# Patient Record
Sex: FEMALE | Race: WHITE | Employment: OTHER | URBAN - NONMETROPOLITAN AREA
[De-identification: names, ages, dates, MRNs, and addresses within clinical notes are randomized per-mention and may not be internally consistent; named-entity substitution may affect disease eponyms.]

---

## 2017-01-03 ENCOUNTER — TELEPHONE (OUTPATIENT)
Dept: OBGYN | Age: 37
End: 2017-01-03

## 2017-01-25 ENCOUNTER — TELEPHONE (OUTPATIENT)
Dept: OBGYN | Age: 37
End: 2017-01-25

## 2017-03-28 ENCOUNTER — TELEPHONE (OUTPATIENT)
Dept: OBGYN | Age: 37
End: 2017-03-28

## 2017-03-28 RX ORDER — ALENDRONATE SODIUM 70 MG/1
TABLET ORAL
Qty: 4 TABLET | Refills: 1 | Status: SHIPPED | OUTPATIENT
Start: 2017-03-28 | End: 2017-04-04

## 2017-03-29 RX ORDER — IBANDRONATE SODIUM 150 MG/1
150 TABLET, FILM COATED ORAL
Qty: 12 TABLET | Refills: 0 | Status: SHIPPED | OUTPATIENT
Start: 2017-03-29 | End: 2018-10-24

## 2017-03-29 RX ORDER — IBANDRONATE SODIUM 150 MG/1
150 TABLET, FILM COATED ORAL
Qty: 12 TABLET | Refills: 0 | Status: SHIPPED | OUTPATIENT
Start: 2017-03-29 | End: 2017-03-29 | Stop reason: SDUPTHER

## 2017-04-03 RX ORDER — ESTRADIOL 0.5 MG/1
TABLET ORAL
Qty: 30 TABLET | Refills: 5 | Status: CANCELLED | OUTPATIENT
Start: 2017-04-03

## 2017-04-04 ENCOUNTER — TELEPHONE (OUTPATIENT)
Dept: OBGYN CLINIC | Age: 37
End: 2017-04-04

## 2017-04-21 ENCOUNTER — TELEPHONE (OUTPATIENT)
Dept: OBGYN | Age: 37
End: 2017-04-21

## 2017-04-24 RX ORDER — ESTERIFIED ESTROGEN AND METHYLTESTOSTERONE .625; 1.25 MG/1; MG/1
1 TABLET ORAL DAILY
Qty: 21 TABLET | Refills: 3 | Status: SHIPPED | OUTPATIENT
Start: 2017-04-24 | End: 2018-06-15 | Stop reason: SDUPTHER

## 2017-10-23 ENCOUNTER — OFFICE VISIT (OUTPATIENT)
Dept: OBGYN | Age: 37
End: 2017-10-23
Payer: COMMERCIAL

## 2017-10-23 VITALS
HEART RATE: 58 BPM | BODY MASS INDEX: 20.99 KG/M2 | WEIGHT: 126 LBS | SYSTOLIC BLOOD PRESSURE: 110 MMHG | DIASTOLIC BLOOD PRESSURE: 73 MMHG | HEIGHT: 65 IN

## 2017-10-23 DIAGNOSIS — Z12.4 CERVICAL CANCER SCREENING: ICD-10-CM

## 2017-10-23 DIAGNOSIS — Z01.419 WOMEN'S ANNUAL ROUTINE GYNECOLOGICAL EXAMINATION: Primary | ICD-10-CM

## 2017-10-23 PROCEDURE — 99395 PREV VISIT EST AGE 18-39: CPT | Performed by: OBSTETRICS & GYNECOLOGY

## 2017-10-23 ASSESSMENT — ENCOUNTER SYMPTOMS
BLOOD IN STOOL: 0
CONSTIPATION: 0
SHORTNESS OF BREATH: 0
TROUBLE SWALLOWING: 0
BACK PAIN: 0
DIARRHEA: 0
COLOR CHANGE: 0
COUGH: 0

## 2017-10-23 NOTE — PROGRESS NOTES
Sarah Christianson is a 39 y.o.  who presents today for pap smear and breast exam.  No c/o at this time. Pt states she switched from Testosterone cream to the pill through [facility], states it is working much better. Last mammogram:  13  Last pap smear:  10.01.15  Contraception:  hyst  :  2  Para:  2  AB:  0  Last bone density:  10.06.15  Last colonoscopy:      Review of Systems   Constitutional: Negative for appetite change, fatigue, fever and unexpected weight change. HENT: Negative for hearing loss and trouble swallowing. Eyes: Negative for visual disturbance. Respiratory: Negative for cough and shortness of breath. Cardiovascular: Negative for chest pain and palpitations. Gastrointestinal: Negative for blood in stool, constipation and diarrhea. Genitourinary: Negative for dyspareunia, dysuria, frequency, pelvic pain and urgency. Musculoskeletal: Negative for arthralgias, back pain and neck pain. Skin: Negative for color change and rash. Neurological: Negative for dizziness, weakness and headaches. Hematological: Does not bruise/bleed easily. Psychiatric/Behavioral: Negative for agitation, confusion and sleep disturbance. The patient is not nervous/anxious.         Past Medical History:   Diagnosis Date    Endometriosis     Osteoarthritis     Osteopenia        Past Surgical History:   Procedure Laterality Date    ABDOMINAL EXPLORATION SURGERY  2003    COLONOSCOPY  7 years ago    [address]       total    KNEE SURGERY Right 1996    THROAT SURGERY      TUBAL LIGATION         Family History   Problem Relation Age of Onset    Cancer Father     Colon Cancer Father     Colon Polyps Father     Diabetes Paternal Aunt     Diabetes Paternal Uncle     Cancer Other     Heart Disease Other     Breast Cancer Paternal Aunt        Social History     Social History    Marital status:      Spouse name: N/A    Number of children: N/A    Years of education: N/A     Occupational History    Not on file. Social History Main Topics    Smoking status: Former Smoker     Packs/day: 0.25     Years: 17.00     Types: Cigarettes    Smokeless tobacco: Never Used      Comment: afraid of gaining weight    Alcohol use No    Drug use: No    Sexual activity: Yes     Partners: Male     Other Topics Concern    Not on file     Social History Narrative    No narrative on file         Current Outpatient Prescriptions:     estrogens, conjugated,-methyltestosterone (ESTRATEST HS) 0.625-1.25 MG per tablet, Take 1 tablet by mouth daily, Disp: 21 tablet, Rfl: 3    ibandronate (BONIVA) 150 MG tablet, Take 1 tablet by mouth every 30 days Take one (1) tablet once per month in the morning with a full glass of water, on an empty stomach, Disp: 12 tablet, Rfl: 0    Calcium Carb-Cholecalciferol (CALCIUM 1000 + D PO), Take  by mouth., Disp: , Rfl:     Allergies   Allergen Reactions    Sulfa Antibiotics        /73 (Site: Left Arm, Position: Sitting, Cuff Size: Medium Adult)   Pulse 58   Ht 5' 5\" (1.651 m)   Wt 126 lb (57.2 kg)   LMP 01/01/2006   BMI 20.97 kg/m²   Physical Exam   Constitutional: She is oriented to person, place, and time. She appears well-developed and well-nourished. No distress. HENT:   Head: Normocephalic and atraumatic. Eyes: Conjunctivae and EOM are normal. Pupils are equal, round, and reactive to light. Neck: Normal range of motion. Neck supple. No tracheal deviation present. No thyromegaly present. Cardiovascular: Normal rate and regular rhythm. Pulmonary/Chest: Effort normal and breath sounds normal. No respiratory distress. Right breast exhibits no inverted nipple, no mass, no nipple discharge, no skin change and no tenderness. Left breast exhibits no inverted nipple, no mass, no nipple discharge, no skin change and no tenderness. Breasts are symmetrical.   Abdominal: Soft.  Bowel sounds are normal. She exhibits no distension and no mass. There is no tenderness. There is no rebound and no guarding. Genitourinary: Rectum normal, vagina normal and uterus normal. There is no rash, tenderness or lesion on the right labia. There is no rash, tenderness or lesion on the left labia. Cervix exhibits no motion tenderness. Right adnexum displays no mass, no tenderness and no fullness. Left adnexum displays no mass, no tenderness and no fullness. Musculoskeletal: Normal range of motion. She exhibits no edema or tenderness. Lymphadenopathy:     She has no cervical adenopathy. She has no axillary adenopathy. Neurological: She is alert and oriented to person, place, and time. Skin: Skin is warm and dry. Psychiatric: She has a normal mood and affect. Her speech is normal and behavior is normal. Judgment and thought content normal. Cognition and memory are normal.       Assessment  1. Women's annual routine gynecological examination  PAP SMEAR   2. Cervical cancer screening  PAP SMEAR       Plan   1. Pap smear  2. RTC one year or prn  3. Refill Testosterone through [facility].

## 2017-10-30 DIAGNOSIS — Z12.4 CERVICAL CANCER SCREENING: ICD-10-CM

## 2017-10-30 DIAGNOSIS — Z01.419 WOMEN'S ANNUAL ROUTINE GYNECOLOGICAL EXAMINATION: ICD-10-CM

## 2017-10-30 RX ORDER — FLUCONAZOLE 150 MG/1
150 TABLET ORAL ONCE
Qty: 1 TABLET | Refills: 0 | Status: SHIPPED | OUTPATIENT
Start: 2017-10-30 | End: 2017-10-30

## 2017-10-30 RX ORDER — FLUCONAZOLE 150 MG/1
150 TABLET ORAL ONCE
Qty: 1 TABLET | Refills: 0 | Status: SHIPPED | OUTPATIENT
Start: 2017-10-30 | End: 2017-10-30 | Stop reason: SDUPTHER

## 2017-10-31 ENCOUNTER — OFFICE VISIT (OUTPATIENT)
Dept: RETAIL CLINIC | Facility: CLINIC | Age: 37
End: 2017-10-31

## 2017-10-31 VITALS
BODY MASS INDEX: 21.15 KG/M2 | HEART RATE: 88 BPM | RESPIRATION RATE: 16 BRPM | WEIGHT: 131.6 LBS | HEIGHT: 66 IN | TEMPERATURE: 98.5 F

## 2017-10-31 DIAGNOSIS — L30.9 DERMATITIS: Primary | ICD-10-CM

## 2017-10-31 PROCEDURE — 99212 OFFICE O/P EST SF 10 MIN: CPT | Performed by: NURSE PRACTITIONER

## 2017-10-31 RX ORDER — IBANDRONATE SODIUM 150 MG/1
150 TABLET, FILM COATED ORAL
COMMUNITY
Start: 2017-03-29

## 2017-10-31 RX ORDER — ESTERIFIED ESTROGEN AND METHYLTESTOSTERONE .625; 1.25 MG/1; MG/1
TABLET ORAL
COMMUNITY
Start: 2017-04-24

## 2017-10-31 NOTE — PATIENT INSTRUCTIONS
Staphylococcal Infection  WHAT IS A STAPHYLOCOCCAL INFECTION?  Staphylococcus aureus, also known as staph, is a bacteria that can cause infections. The most common type of staph infection is a skin infection. Staph can spread easily from one person to another.  HOW IS A STAPHYLOCOCCAL INFECTION DIAGNOSED?  Your health care provider may diagnose and treat a staph infection based on an exam. He or she may also do a culture from the affected area:  · To find out exactly what type of bacteria is causing your infection.  · To decide which medicine is best to treat it.  HOW IS A STAPHYLOCOCCAL INFECTION TREATED?  Most staph infections can be easily treated with antibiotic medicine. Some infections must be drained. More serious types of staph, including methicillin-resistant Staphylococcus aureus (MRSA) may be more difficult to treat. They may require a strong antibiotic or more than one antibiotic.  WHAT SHOULD I DO AT HOME?  · Take your antibiotic as directed by your health care provider. Finish your antibiotic even if you start to feel better.  · Keep all follow-up visits as directed by your health care provider.  · Tell all of your health care providers including dentists that you have a staph infection, especially if you have been diagnosed with MRSA.  · Ask your health care provider when it is safe for you to return to school or work.  · To prevent spreading the infection:    Keep infections and pus or drainage material covered with clean, dry bandages (dressings), or as directed by your health care provider.    Wash your hands with soap and water often, especially after changing dressings or touching your infection.    Advise your family and other close contacts to wash their hands frequently with soap and water. They should always wash their hands after changing your dressings, touching the infected area, or touching infected materials.    Avoid sharing personal items that may have had contact with your infection.  These include towels, washcloths, razors, clothing, and uniforms.    Wash your linens and clothes with hot water and laundry detergent. Drying clothes in a hot dryer--rather than air-drying--also helps to kill bacteria in clothes.  WHEN SHOULD I CONTACT MY HEALTH CARE PROVIDER?  Although they are usually easy to treat, some staph infections can become very serious. You should contact your health care provider if your infection gets worse or if you have a fever.     This information is not intended to replace advice given to you by your health care provider. Make sure you discuss any questions you have with your health care provider.     Document Released: 01/08/2016 Document Revised: 04/10/2017 Document Reviewed: 01/08/2016  Elsevier Interactive Patient Education ©2017 [employer]

## 2017-10-31 NOTE — PROGRESS NOTES
Subjective   Yeimi Brown is a 37 y.o. female who presents to the clinic with:      HPI Comments: Pimple like rash on right lateral chest surrounding a recent tattoo. She has had tattoos before without any reaction. She denies fever or chills.    Rash            The following portions of the patient's history were reviewed and updated as appropriate: allergies, current medications, past family history, past medical history, past social history, past surgical history and problem list.       Review of Systems   Skin: Positive for rash.   All other systems reviewed and are negative.        Objective   Physical Exam   Constitutional: She is oriented to person, place, and time. She appears well-developed and well-nourished.   HENT:   Head: Normocephalic and atraumatic.   Cardiovascular: Normal rate and regular rhythm.    Pulmonary/Chest: Effort normal and breath sounds normal.   Abdominal: Soft.   Neurological: She is alert and oriented to person, place, and time.   Skin: Skin is warm and dry.   Tattoo is approximately 4 cm in diameter, does not look infected. Maculopapular, pustular pinpoint size lesions surrounding the tattoo, no drainage, streaking or induration.   Psychiatric: She has a normal mood and affect.         Assessment/Plan   Yeimi was seen today for rash.    Diagnoses and all orders for this visit:    Dermatitis  -     mupirocin (BACTROBAN) 2 % ointment; Apply  topically 3 (Three) Times a Day for 7 days.    If any induration, worsening of dermatitis or fever develops, see PCP.

## 2018-05-30 ENCOUNTER — TRANSCRIBE ORDERS (OUTPATIENT)
Dept: GENERAL RADIOLOGY | Facility: HOSPITAL | Age: 38
End: 2018-05-30

## 2018-05-30 ENCOUNTER — TRANSCRIBE ORDERS (OUTPATIENT)
Dept: ADMINISTRATIVE | Facility: HOSPITAL | Age: 38
End: 2018-05-30

## 2018-05-30 ENCOUNTER — HOSPITAL ENCOUNTER (OUTPATIENT)
Dept: GENERAL RADIOLOGY | Facility: HOSPITAL | Age: 38
Discharge: HOME | End: 2018-05-30
Admitting: NURSE PRACTITIONER

## 2018-05-30 ENCOUNTER — HOSPITAL ENCOUNTER (OUTPATIENT)
Dept: MRI IMAGING | Facility: HOSPITAL | Age: 38
Discharge: HOME | End: 2018-05-30

## 2018-05-30 DIAGNOSIS — M54.5 LOW BACK PAIN, UNSPECIFIED BACK PAIN LATERALITY, UNSPECIFIED CHRONICITY, WITH SCIATICA PRESENCE UNSPECIFIED: Primary | ICD-10-CM

## 2018-05-30 DIAGNOSIS — M54.16 LUMBAR RADICULOPATHY: ICD-10-CM

## 2018-05-30 DIAGNOSIS — M54.5 LOW BACK PAIN, UNSPECIFIED BACK PAIN LATERALITY, UNSPECIFIED CHRONICITY, WITH SCIATICA PRESENCE UNSPECIFIED: ICD-10-CM

## 2018-05-30 DIAGNOSIS — M54.16 LUMBAR RADICULOPATHY: Primary | ICD-10-CM

## 2018-05-30 PROCEDURE — 72110 X-RAY EXAM L-2 SPINE 4/>VWS: CPT

## 2018-05-30 PROCEDURE — 72148 MRI LUMBAR SPINE W/O DYE: CPT

## 2018-06-15 ENCOUNTER — TELEPHONE (OUTPATIENT)
Dept: OBGYN | Age: 38
End: 2018-06-15

## 2018-06-15 DIAGNOSIS — Z78.0 MENOPAUSE: Primary | ICD-10-CM

## 2018-07-31 DIAGNOSIS — Z78.0 MENOPAUSE: ICD-10-CM

## 2018-07-31 RX ORDER — ESTERIFIED ESTROGEN AND METHYLTESTOSTERONE .625; 1.25 MG/1; MG/1
1 TABLET ORAL DAILY
Qty: 30 TABLET | Refills: 3 | Status: SHIPPED | OUTPATIENT
Start: 2018-07-31 | End: 2018-07-31 | Stop reason: SDUPTHER

## 2018-07-31 NOTE — TELEPHONE ENCOUNTER
Pt requests refill on hormones to last until appt in October. [facility]. Only has one tablet left.

## 2018-08-01 RX ORDER — ESTERIFIED ESTROGEN AND METHYLTESTOSTERONE .625; 1.25 MG/1; MG/1
1 TABLET ORAL DAILY
Qty: 30 TABLET | Refills: 3 | Status: SHIPPED | OUTPATIENT
Start: 2018-08-01 | End: 2018-10-24 | Stop reason: DRUGHIGH

## 2018-10-24 ENCOUNTER — OFFICE VISIT (OUTPATIENT)
Dept: OBGYN | Age: 38
End: 2018-10-24
Payer: MEDICAID

## 2018-10-24 VITALS
HEIGHT: 65 IN | DIASTOLIC BLOOD PRESSURE: 74 MMHG | WEIGHT: 147 LBS | SYSTOLIC BLOOD PRESSURE: 102 MMHG | BODY MASS INDEX: 24.49 KG/M2

## 2018-10-24 DIAGNOSIS — M85.80 OSTEOPENIA, UNSPECIFIED LOCATION: ICD-10-CM

## 2018-10-24 DIAGNOSIS — N95.1 MENOPAUSAL SYMPTOMS: ICD-10-CM

## 2018-10-24 DIAGNOSIS — Z12.4 SCREENING FOR CERVICAL CANCER: ICD-10-CM

## 2018-10-24 DIAGNOSIS — Z01.419 WOMEN'S ANNUAL ROUTINE GYNECOLOGICAL EXAMINATION: Primary | ICD-10-CM

## 2018-10-24 PROCEDURE — 99395 PREV VISIT EST AGE 18-39: CPT | Performed by: OBSTETRICS & GYNECOLOGY

## 2018-10-24 PROCEDURE — G8484 FLU IMMUNIZE NO ADMIN: HCPCS | Performed by: OBSTETRICS & GYNECOLOGY

## 2018-10-24 RX ORDER — ESTERIFIED ESTROGEN AND METHYLTESTOSTERONE 1.25; 2.5 MG/1; MG/1
1 TABLET ORAL DAILY
Qty: 30 TABLET | Refills: 3 | Status: SHIPPED | OUTPATIENT
Start: 2018-10-24 | End: 2019-11-14 | Stop reason: SDUPTHER

## 2018-10-24 ASSESSMENT — ENCOUNTER SYMPTOMS
EYES NEGATIVE: 1
RESPIRATORY NEGATIVE: 1
GASTROINTESTINAL NEGATIVE: 1
ALLERGIC/IMMUNOLOGIC NEGATIVE: 1

## 2018-10-24 NOTE — PROGRESS NOTES
Pt is here for breast exam and pap smear. Last mammogram:    Last pap smear:  10.23.17  Contraception:  hyst due to endometriosis  :  2  Para:  2  AB:  0  Last bone density:    osteopenia  Last colonoscopy: is on a schedule     GYN:  .

## 2018-10-31 ENCOUNTER — HOSPITAL ENCOUNTER (OUTPATIENT)
Dept: WOMENS IMAGING | Age: 38
Discharge: HOME | End: 2018-10-31
Payer: MEDICAID

## 2018-10-31 DIAGNOSIS — M85.80 OSTEOPENIA, UNSPECIFIED LOCATION: ICD-10-CM

## 2018-10-31 PROCEDURE — 77080 DXA BONE DENSITY AXIAL: CPT

## 2018-11-14 ENCOUNTER — TELEPHONE (OUTPATIENT)
Dept: OBGYN | Age: 38
End: 2018-11-14

## 2018-11-14 DIAGNOSIS — M81.0 MENOPAUSAL OSTEOPOROSIS: Primary | ICD-10-CM

## 2018-11-14 RX ORDER — ALENDRONATE SODIUM 70 MG/1
70 TABLET ORAL
Qty: 4 TABLET | Refills: 3 | Status: SHIPPED | OUTPATIENT
Start: 2018-11-14 | End: 2020-01-29

## 2019-11-11 RX ORDER — ESTERIFIED ESTROGENS AND METHYLTESTOSTERONE 1.25; 2.5 MG/1; MG/1
TABLET, FILM COATED ORAL
Qty: 30 TABLET | Refills: 0 | OUTPATIENT
Start: 2019-11-11

## 2019-11-14 ENCOUNTER — TELEPHONE (OUTPATIENT)
Dept: OBGYN | Age: 39
End: 2019-11-14

## 2019-11-14 DIAGNOSIS — N95.1 MENOPAUSAL SYMPTOMS: ICD-10-CM

## 2019-11-14 RX ORDER — ESTERIFIED ESTROGEN AND METHYLTESTOSTERONE 1.25; 2.5 MG/1; MG/1
1 TABLET ORAL DAILY
Qty: 30 TABLET | Refills: 2 | OUTPATIENT
Start: 2019-11-14 | End: 2019-12-05 | Stop reason: SDUPTHER

## 2019-12-05 ENCOUNTER — OFFICE VISIT (OUTPATIENT)
Dept: OBGYN | Age: 39
End: 2019-12-05

## 2019-12-05 VITALS
HEART RATE: 76 BPM | DIASTOLIC BLOOD PRESSURE: 79 MMHG | HEIGHT: 65 IN | SYSTOLIC BLOOD PRESSURE: 114 MMHG | BODY MASS INDEX: 21.66 KG/M2 | WEIGHT: 130 LBS

## 2019-12-05 DIAGNOSIS — Z80.0 FAMILY HISTORY OF COLON CANCER: ICD-10-CM

## 2019-12-05 DIAGNOSIS — N95.1 MENOPAUSAL SYMPTOMS: ICD-10-CM

## 2019-12-05 DIAGNOSIS — Z01.419 WELL WOMAN EXAM: Primary | ICD-10-CM

## 2019-12-05 PROCEDURE — 99396 PREV VISIT EST AGE 40-64: CPT | Performed by: ADVANCED PRACTICE MIDWIFE

## 2019-12-05 RX ORDER — ESTERIFIED ESTROGEN AND METHYLTESTOSTERONE 1.25; 2.5 MG/1; MG/1
1 TABLET ORAL DAILY
Qty: 30 TABLET | Refills: 2 | Status: SHIPPED | OUTPATIENT
Start: 2019-12-05 | End: 2020-03-16 | Stop reason: SDUPTHER

## 2019-12-05 RX ORDER — VALACYCLOVIR HYDROCHLORIDE 500 MG/1
1000 TABLET, FILM COATED ORAL 2 TIMES DAILY
Qty: 60 TABLET | Refills: 3 | Status: SHIPPED | OUTPATIENT
Start: 2019-12-05

## 2019-12-05 RX ORDER — VALACYCLOVIR HYDROCHLORIDE 500 MG/1
1000 TABLET, FILM COATED ORAL 2 TIMES DAILY
COMMUNITY
End: 2019-12-05 | Stop reason: SDUPTHER

## 2019-12-05 RX ORDER — OMEPRAZOLE 10 MG/1
40 CAPSULE, DELAYED RELEASE ORAL DAILY
COMMUNITY
End: 2020-09-11 | Stop reason: SDUPTHER

## 2019-12-05 ASSESSMENT — ENCOUNTER SYMPTOMS
GASTROINTESTINAL NEGATIVE: 1
RESPIRATORY NEGATIVE: 1
EYES NEGATIVE: 1
ALLERGIC/IMMUNOLOGIC NEGATIVE: 1

## 2020-01-22 NOTE — PROGRESS NOTES
Subjective:      Patient ID: Jenn Rowe is a 35 y.o. female  Tim Linder MD  [facility]  Chief Complaint   Patient presents with    Colonoscopy     father with colon cancer    Gastroesophageal Reflux         Patient with family history of colon cancer. Due for screening colonoscopy. Last c-scope over 5 yr ago. No personal history of colon polyps. Father had \"aggressive colon cancer\" diagnosed at advanced stage at age 52. Patient denies rectal bleeding, change in bowel habit or abdominal pain.       C/o reflux. GERD  Paitent complains of heartburn/reflux. Symptoms have been present for years. Symptoms occur daily and have been controlled with medication. This has been associated with heartburn and water brash. She denies dysphagia, hematemesis, melena, nausea, unexpected weight loss and upper abdominal discomfort. Medical therapy in the past has included proton pump inhibitors. No previous EGD    Assessment:      1. Chronic GERD    2. Family history of colon cancer            Plan:      Schedule colonoscopy and endoscopy       Instruct on bowel prep. Nothing to eat or drink after midnight the day of the exam.  Unable to drive for 24 hours after the procedure. No aspirin or nonsteroidal anti-inflammatories for 5 days before procedure. I have discussed the benefits, alternatives, and risks (including bleeding, perforation and death)  for pursuing Endoscopy (EGD/Colonscopy/EUS/ERCP) with the patient and they are willing to continue. We also discussed the need for anesthesia, IV access, proper dietary changes, medication changes if necessary, and need for bowel prep (if ordered) prior to their Endoscopic procedure. They are aware they must have someone accompany them to their scheduled procedure to drive them home - they agree to the above and are willing to continue.       Plan for anesthesia: MAC  no reported complications                  Family History   Problem Relation Age of Onset    Cancer Father     Colon Cancer Father 52    Colon Polyps Father     Liver Cancer Father     Diabetes Paternal Aunt     Diabetes Paternal Uncle     Cancer Other     Heart Disease Other     Heart Attack Paternal Grandfather     Breast Cancer Paternal Aunt     Heart Attack Paternal Grandmother     Colon Polyps Other         cousin    Esophageal Cancer Neg Hx     Liver Disease Neg Hx     Stomach Cancer Neg Hx     Rectal Cancer Neg Hx        Past Medical History:   Diagnosis Date    Endometriosis     Osteoarthritis     Osteopenia        Past Surgical History:   Procedure Laterality Date    ABDOMINAL EXPLORATION SURGERY  2003    BREAST ENHANCEMENT SURGERY  04/2018    COLONOSCOPY  7 years ago    Peter Miranda COLONOSCOPY  08/05/2013    Dilia: normal 5 yr r/c    HYSTERECTOMY  2005    total    KNEE SURGERY Right 1996    THROAT SURGERY      TUBAL LIGATION  2000       Current Outpatient Medications   Medication Sig Dispense Refill    omeprazole (PRILOSEC) 10 MG delayed release capsule Take 40 mg by mouth daily      valACYclovir (VALTREX) 500 MG tablet Take 2 tablets by mouth 2 times daily 60 tablet 3    estrogens, conjugated,-methyltestosterone (ESTRATEST) 1.25-2.5 MG per tablet Take 1 tablet by mouth daily. 30 tablet 2     No current facility-administered medications for this visit. Allergies   Allergen Reactions    Sulfa Antibiotics         reports that she has quit smoking. Her smoking use included cigarettes. She has a 4.25 pack-year smoking history. She has never used smokeless tobacco. She reports current alcohol use. She reports that she does not use drugs. Review of Systems   Constitutional: Negative for appetite change, fever and unexpected weight change. HENT: Negative for sore throat and voice change. Respiratory: Negative for cough, chest tightness and shortness of breath. Cardiovascular: Negative for chest pain, palpitations and leg swelling.

## 2020-01-29 ENCOUNTER — OFFICE VISIT (OUTPATIENT)
Dept: GASTROENTEROLOGY | Age: 40
End: 2020-01-29
Payer: COMMERCIAL

## 2020-01-29 VITALS
DIASTOLIC BLOOD PRESSURE: 80 MMHG | HEART RATE: 75 BPM | HEIGHT: 65 IN | BODY MASS INDEX: 21.33 KG/M2 | SYSTOLIC BLOOD PRESSURE: 120 MMHG | WEIGHT: 128 LBS | OXYGEN SATURATION: 98 %

## 2020-01-29 PROCEDURE — 99204 OFFICE O/P NEW MOD 45 MIN: CPT | Performed by: NURSE PRACTITIONER

## 2020-01-29 ASSESSMENT — ENCOUNTER SYMPTOMS
VOICE CHANGE: 0
NAUSEA: 0
SHORTNESS OF BREATH: 0
DIARRHEA: 0
ABDOMINAL DISTENTION: 0
CHEST TIGHTNESS: 0
COUGH: 0
SORE THROAT: 0
RECTAL PAIN: 0
CONSTIPATION: 0
VOMITING: 0
ABDOMINAL PAIN: 0
BLOOD IN STOOL: 0
BACK PAIN: 0

## 2020-03-16 RX ORDER — ESTERIFIED ESTROGENS AND METHYLTESTOSTERONE 1.25; 2.5 MG/1; MG/1
TABLET, FILM COATED ORAL
Qty: 30 TABLET | Refills: 0 | OUTPATIENT
Start: 2020-03-16

## 2020-03-16 RX ORDER — ESTERIFIED ESTROGEN AND METHYLTESTOSTERONE 1.25; 2.5 MG/1; MG/1
1 TABLET ORAL DAILY
Qty: 30 TABLET | Refills: 0 | Status: SHIPPED | OUTPATIENT
Start: 2020-03-16 | End: 2020-04-07 | Stop reason: SDUPTHER

## 2020-04-07 ENCOUNTER — TELEMEDICINE (OUTPATIENT)
Dept: OBGYN | Age: 40
End: 2020-04-07
Payer: COMMERCIAL

## 2020-04-07 ENCOUNTER — E-VISIT (OUTPATIENT)
Dept: OBGYN | Age: 40
End: 2020-04-07
Payer: COMMERCIAL

## 2020-04-07 PROCEDURE — 99213 OFFICE O/P EST LOW 20 MIN: CPT | Performed by: NURSE PRACTITIONER

## 2020-04-07 PROCEDURE — 99421 OL DIG E/M SVC 5-10 MIN: CPT | Performed by: OBSTETRICS & GYNECOLOGY

## 2020-04-07 RX ORDER — ESTERIFIED ESTROGEN AND METHYLTESTOSTERONE 1.25; 2.5 MG/1; MG/1
1 TABLET ORAL DAILY
Qty: 30 TABLET | Refills: 0 | Status: SHIPPED | OUTPATIENT
Start: 2020-04-07 | End: 2020-05-18

## 2020-04-07 ASSESSMENT — ENCOUNTER SYMPTOMS
EYES NEGATIVE: 1
RESPIRATORY NEGATIVE: 1
GASTROINTESTINAL NEGATIVE: 1

## 2020-04-07 NOTE — PROGRESS NOTES
[facility]LAIN [facility]  Nurse Practitioner Office Note  TELEHEALTH EVALUATION -- Audio/Visual (During CANOD-17 public health emergency)    Meryl Toussaint is a 35 y.o. female who presents today for her medical conditions/ complaints as noted below. Chief Complaint   Patient presents with    Medication Refill     testosterone         HPI  Pt needs refills on her estratest she uses mainly for decreased libido. She says it helps a little but is aware on the highest dose and things still aren't as good as she would like. For now wants refill though. sammy is done. Patient Active Problem List   Diagnosis    Decreased libido    Insomnia    Family history of colon cancer       Patient's last menstrual period was 01/01/2006. No obstetric history on file.     Past Medical History:   Diagnosis Date    Endometriosis     Osteoarthritis     Osteopenia      Past Surgical History:   Procedure Laterality Date    ABDOMINAL EXPLORATION SURGERY  2003    BREAST ENHANCEMENT SURGERY  04/2018    COLONOSCOPY  7 years ago    Linda 41 COLONOSCOPY  08/05/2013    Dilia: normal 5 yr r/c    HYSTERECTOMY  2005    total    KNEE SURGERY Right 1996    THROAT SURGERY      TUBAL LIGATION  2000     Family History   Problem Relation Age of Onset    Cancer Father     Colon Cancer Father 52    Colon Polyps Father     Liver Cancer Father     Diabetes Paternal Aunt     Diabetes Paternal Uncle     Cancer Other     Heart Disease Other     Heart Attack Paternal Grandfather     Breast Cancer Paternal Aunt     Heart Attack Paternal [de-identified]     Colon Polyps Other         cousin    Esophageal Cancer Neg Hx     Liver Disease Neg Hx     Stomach Cancer Neg Hx     Rectal Cancer Neg Hx      Social History     Tobacco Use    Smoking status: Former Smoker     Packs/day: 0.25     Years: 17.00     Pack years: 4.25     Types: Cigarettes    Smokeless tobacco: Never Used    Tobacco comment: afraid of gaining weight-3 stefano a day   Substance Use Topics    Alcohol use: Yes     Comment: occ       Current Outpatient Medications   Medication Sig Dispense Refill    estrogens, conjugated,-methyltestosterone (ESTRATEST) 1.25-2.5 MG per tablet Take 1 tablet by mouth daily. 30 tablet 0    omeprazole (PRILOSEC) 10 MG delayed release capsule Take 40 mg by mouth daily      valACYclovir (VALTREX) 500 MG tablet Take 2 tablets by mouth 2 times daily 60 tablet 3     No current facility-administered medications for this visit. Allergies   Allergen Reactions    Sulfa Antibiotics      There were no vitals filed for this visit. There is no height or weight on file to calculate BMI. Review of Systems   Constitutional: Negative. HENT: Negative. Eyes: Negative. Respiratory: Negative. Cardiovascular: Negative. Gastrointestinal: Negative. Genitourinary: Negative for difficulty urinating, dyspareunia, dysuria, enuresis, frequency, hematuria, menstrual problem, pelvic pain, urgency and vaginal discharge. Musculoskeletal: Negative. Skin: Negative. Neurological: Negative. Psychiatric/Behavioral: Negative. Due to this being a TeleHealth encounter, evaluation of the following organ systems is limited: Vitals/Constitutional/EENT/Resp/CV/GI//MS/Neuro/Skin/Heme-Lymph-Imm. Physical Exam  Vitals signs and nursing note reviewed. Constitutional:       General: She is not in acute distress. Appearance: She is well-developed. She is not diaphoretic. HENT:      Head: Normocephalic and atraumatic. Eyes:      Conjunctiva/sclera: Conjunctivae normal.      Pupils: Pupils are equal, round, and reactive to light. Neck:      Musculoskeletal: Normal range of motion. Pulmonary:      Effort: Pulmonary effort is normal.   Abdominal:      Tenderness: There is no guarding. Musculoskeletal: Normal range of motion. Comments: Normal ROM in all 4 extremities; normal gait   Skin:     General: Skin is warm and dry.

## 2020-05-18 RX ORDER — ESTERIFIED ESTROGENS AND METHYLTESTOSTERONE 1.25; 2.5 MG/1; MG/1
TABLET, FILM COATED ORAL
Qty: 30 TABLET | Refills: 0 | Status: SHIPPED
Start: 2020-05-18 | End: 2020-07-14 | Stop reason: ALTCHOICE

## 2020-05-19 ENCOUNTER — TELEPHONE (OUTPATIENT)
Dept: OBGYN | Age: 40
End: 2020-05-19

## 2020-05-19 NOTE — TELEPHONE ENCOUNTER
[address] Monsivais called to have Prescription for [employer] sent or called in again. I confirmed the medication and dosage.

## 2020-06-22 ENCOUNTER — TELEPHONE (OUTPATIENT)
Dept: OBGYN | Age: 40
End: 2020-06-22

## 2020-06-22 RX ORDER — FLUCONAZOLE 150 MG/1
150 TABLET ORAL
Qty: 2 TABLET | Refills: 0 | Status: SHIPPED | OUTPATIENT
Start: 2020-06-22 | End: 2020-06-28

## 2020-07-14 ENCOUNTER — TELEMEDICINE (OUTPATIENT)
Dept: OBGYN | Age: 40
End: 2020-07-14
Payer: COMMERCIAL

## 2020-07-14 PROCEDURE — 99214 OFFICE O/P EST MOD 30 MIN: CPT | Performed by: NURSE PRACTITIONER

## 2020-07-14 RX ORDER — ONDANSETRON 4 MG/1
4 TABLET, ORALLY DISINTEGRATING ORAL EVERY 8 HOURS PRN
Qty: 30 TABLET | Refills: 2 | Status: SHIPPED | OUTPATIENT
Start: 2020-07-14 | End: 2021-06-15

## 2020-07-14 RX ORDER — BREMELANOTIDE 1.75 MG/.3ML
1 INJECTION SUBCUTANEOUS PRN
Qty: 4 PEN | Refills: 2 | Status: SHIPPED | OUTPATIENT
Start: 2020-07-14

## 2020-07-14 ASSESSMENT — ENCOUNTER SYMPTOMS
RESPIRATORY NEGATIVE: 1
EYES NEGATIVE: 1
GASTROINTESTINAL NEGATIVE: 1

## 2020-07-14 NOTE — PROGRESS NOTES
[facility]  Nurse Practitioner Office Note  TELEHEALTH EVALUATION -- Audio/Visual (During UTMUM-35 public health emergency)    Jorge Alberto Mata is a 35 y.o. female who presents today for her medical conditions/ complaints as noted below. Chief Complaint   Patient presents with    Follow-up     hrt, tesosterone         HPI  Pt following up for HRT refill, estratest. She really doesn't think testosterone is working and wants to try something else. Has no desire, low libido, nothing helps. We discussed Vyleesi last encounter-she wants to try that  Patient Active Problem List   Diagnosis    Decreased libido    Insomnia    Family history of colon cancer       Patient's last menstrual period was 01/01/2006. No obstetric history on file.     Past Medical History:   Diagnosis Date    Endometriosis     Osteoarthritis     Osteopenia      Past Surgical History:   Procedure Laterality Date    ABDOMINAL EXPLORATION SURGERY  2003    BREAST ENHANCEMENT SURGERY  04/2018    COLONOSCOPY  7 years ago    Linda 41 COLONOSCOPY  08/05/2013    Dilia: normal 5 yr r/c    HYSTERECTOMY  2005    total    KNEE SURGERY Right 1996    THROAT SURGERY      TUBAL LIGATION  2000     Family History   Problem Relation Age of Onset    Cancer Father     Colon Cancer Father 52    Colon Polyps Father     Liver Cancer Father     Diabetes Paternal Aunt     Diabetes Paternal Uncle     Cancer Other     Heart Disease Other     Heart Attack Paternal Grandfather     Breast Cancer Paternal Aunt     Heart Attack Paternal [de-identified]     Colon Polyps Other         cousin    Esophageal Cancer Neg Hx     Liver Disease Neg Hx     Stomach Cancer Neg Hx     Rectal Cancer Neg Hx      Social History     Tobacco Use    Smoking status: Former Smoker     Packs/day: 0.25     Years: 17.00     Pack years: 4.25     Types: Cigarettes    Smokeless tobacco: Never Used    Tobacco comment: afraid of gaining weight-3 cigaretts a day Substance Use Topics    Alcohol use: Yes     Comment: occ       Current Outpatient Medications   Medication Sig Dispense Refill    ondansetron (ZOFRAN ODT) 4 MG disintegrating tablet Take 1 tablet by mouth every 8 hours as needed for Nausea or Vomiting If nausea occurs from injection 30 tablet 2    estrogens, conjugated, (PREMARIN) 1.25 MG tablet Take 1 tablet by mouth daily 30 tablet 3    Bremelanotide Acetate (VYLEESI) 1.75 MG/0.3ML SOAJ Inject 1 pen into the skin as needed (prior to sex, decreased libido) Inject at least 45 min prior to sex 4 pen 2    omeprazole (PRILOSEC) 10 MG delayed release capsule Take 40 mg by mouth daily      valACYclovir (VALTREX) 500 MG tablet Take 2 tablets by mouth 2 times daily 60 tablet 3     No current facility-administered medications for this visit. Allergies   Allergen Reactions    Sulfa Antibiotics      There were no vitals filed for this visit. There is no height or weight on file to calculate BMI. Review of Systems   Constitutional: Negative. HENT: Negative. Eyes: Negative. Respiratory: Negative. Cardiovascular: Negative. Gastrointestinal: Negative. Genitourinary: Negative for difficulty urinating, dyspareunia, dysuria, enuresis, frequency, hematuria, menstrual problem, pelvic pain, urgency and vaginal discharge. Musculoskeletal: Negative. Skin: Negative. Neurological: Negative. Psychiatric/Behavioral: Negative. Due to this being a TeleHealth encounter, evaluation of the following organ systems is limited: Vitals/Constitutional/EENT/Resp/CV/GI//MS/Neuro/Skin/Heme-Lymph-Imm. Physical Exam  Constitutional:       General: She is not in acute distress. Appearance: She is well-developed. She is not diaphoretic. HENT:      Head: Normocephalic and atraumatic. Eyes:      Conjunctiva/sclera: Conjunctivae normal.      Pupils: Pupils are equal, round, and reactive to light.    Neck:      Musculoskeletal: Normal range of motion. Pulmonary:      Effort: Pulmonary effort is normal.   Abdominal:      Tenderness: There is no guarding. Musculoskeletal: Normal range of motion. Comments: Normal ROM in all 4 extremities; normal gait   Skin:     General: Skin is warm and dry. Neurological:      Mental Status: She is alert and oriented to person, place, and time. Motor: No abnormal muscle tone. Coordination: Coordination normal.   Psychiatric:         Behavior: Behavior normal.          Diagnosis Orders   1. Menopausal symptoms  estrogens, conjugated, (PREMARIN) 1.25 MG tablet   2. Hypoactive sexual desire disorder  Bremelanotide Acetate (VYLEESI) 1.75 MG/0.3ML SOAJ   3. Decreased libido         MEDICATIONS:  Orders Placed This Encounter   Medications    ondansetron (ZOFRAN ODT) 4 MG disintegrating tablet     Sig: Take 1 tablet by mouth every 8 hours as needed for Nausea or Vomiting If nausea occurs from injection     Dispense:  30 tablet     Refill:  2    estrogens, conjugated, (PREMARIN) 1.25 MG tablet     Sig: Take 1 tablet by mouth daily     Dispense:  30 tablet     Refill:  3    Bremelanotide Acetate (VYLEESI) 1.75 MG/0.3ML SOAJ     Sig: Inject 1 pen into the skin as needed (prior to sex, decreased libido) Inject at least 45 min prior to sex     Dispense:  4 pen     Refill:  2       ORDERS:  No orders of the defined types were placed in this encounter. PLAN:  1. zofran prn nausea from shot  2. Script for [facility] sent  3. Will send in estrogen since stopping estratest  4. Follow up in 1 month     Pursuant to the emergency declaration under the [facility], [address] [place] [address] authority and the [employer] Act, this Virtual  Visit was conducted, with patient's consent, to reduce the patient's risk of exposure to COVID-19 and provide continuity of care for an established patient.     Services were provided through a video synchronous discussion virtually to substitute for in-person clinic visit.

## 2020-08-07 RX ORDER — FLUCONAZOLE 150 MG/1
150 TABLET ORAL
Qty: 2 TABLET | Refills: 0 | Status: SHIPPED | OUTPATIENT
Start: 2020-08-07 | End: 2021-06-15

## 2020-09-11 RX ORDER — OMEPRAZOLE 10 MG/1
20 CAPSULE, DELAYED RELEASE ORAL DAILY
Qty: 60 CAPSULE | Refills: 2 | Status: SHIPPED | OUTPATIENT
Start: 2020-09-11 | End: 2020-12-11 | Stop reason: SDUPTHER

## 2020-09-25 RX ORDER — FLUCONAZOLE 150 MG/1
150 TABLET ORAL
Qty: 2 TABLET | Refills: 0 | Status: SHIPPED | OUTPATIENT
Start: 2020-09-25 | End: 2020-10-01

## 2020-12-10 ENCOUNTER — TELEPHONE (OUTPATIENT)
Dept: OBGYN | Age: 40
End: 2020-12-10

## 2020-12-11 RX ORDER — OMEPRAZOLE 10 MG/1
20 CAPSULE, DELAYED RELEASE ORAL DAILY
Qty: 60 CAPSULE | Refills: 5 | Status: SHIPPED | OUTPATIENT
Start: 2020-12-11 | End: 2021-02-22 | Stop reason: SDUPTHER

## 2021-02-22 ENCOUNTER — TELEPHONE (OUTPATIENT)
Dept: OBGYN CLINIC | Age: 41
End: 2021-02-22

## 2021-02-22 DIAGNOSIS — M85.80 OSTEOPENIA AFTER MENOPAUSE: ICD-10-CM

## 2021-02-22 DIAGNOSIS — Z78.0 OSTEOPENIA AFTER MENOPAUSE: ICD-10-CM

## 2021-02-22 DIAGNOSIS — M85.80 OSTEOPENIA DETERMINED BY X-RAY: Primary | ICD-10-CM

## 2021-02-22 NOTE — TELEPHONE ENCOUNTER
Dominic Thompson called would like to know if she is due for bone denisity test. If so need order and appt. Please call pt advise.

## 2021-06-15 ENCOUNTER — OFFICE VISIT (OUTPATIENT)
Dept: OBGYN CLINIC | Age: 41
End: 2021-06-15
Payer: COMMERCIAL

## 2021-06-15 VITALS
HEIGHT: 65 IN | WEIGHT: 121 LBS | HEART RATE: 81 BPM | DIASTOLIC BLOOD PRESSURE: 76 MMHG | SYSTOLIC BLOOD PRESSURE: 114 MMHG | BODY MASS INDEX: 20.16 KG/M2

## 2021-06-15 DIAGNOSIS — N95.1 MENOPAUSAL SYMPTOMS: ICD-10-CM

## 2021-06-15 DIAGNOSIS — Z12.31 SCREENING MAMMOGRAM, ENCOUNTER FOR: ICD-10-CM

## 2021-06-15 DIAGNOSIS — Z01.419 WELL WOMAN EXAM WITH ROUTINE GYNECOLOGICAL EXAM: Primary | ICD-10-CM

## 2021-06-15 DIAGNOSIS — Z79.890 HORMONE REPLACEMENT THERAPY: ICD-10-CM

## 2021-06-15 PROCEDURE — 99396 PREV VISIT EST AGE 40-64: CPT | Performed by: NURSE PRACTITIONER

## 2021-06-15 ASSESSMENT — ENCOUNTER SYMPTOMS
EYES NEGATIVE: 1
GASTROINTESTINAL NEGATIVE: 1
RESPIRATORY NEGATIVE: 1

## 2021-06-15 NOTE — PATIENT INSTRUCTIONS
Patient Education        Breast Self-Exam: Care Instructions  Your Care Instructions     A breast self-exam is when you check your breasts for lumps or changes. This regular exam helps you learn how your breasts normally look and feel. Most breast problems or changes are not because of cancer. Breast self-exam is not a substitute for a mammogram. Having regular breast exams by your doctor and regular mammograms improve your chances of finding any problems with your breasts. Some women set a time each month to do a step-by-step breast self-exam. Other women like a less formal system. They might look at their breasts as they brush their teeth, or feel their breasts once in a while in the shower. If you notice a change in your breast, tell your doctor. Follow-up care is a key part of your treatment and safety. Be sure to make and go to all appointments, and call your doctor if you are having problems. It's also a good idea to know your test results and keep a list of the medicines you take. How do you do a breast self-exam?  · The best time to examine your breasts is usually one week after your menstrual period begins. Your breasts should not be tender then. If you do not have periods, you might do your exam on a day of the month that is easy to remember. · To examine your breasts:  ? Remove all your clothes above the waist and lie down. When you are lying down, your breast tissue spreads evenly over your chest wall, which makes it easier to feel all your breast tissue. ? Use the pads--not the fingertips--of the 3 middle fingers of your left hand to check your right breast. Move your fingers slowly in small coin-sized circles that overlap. ? Use three levels of pressure to feel of all your breast tissue. Use light pressure to feel the tissue close to the skin surface. Use medium pressure to feel a little deeper. Use firm pressure to feel your tissue close to your breastbone and ribs.  Use each pressure level to also a good idea to know your test results and keep a list of the medicines you take. How can you care for yourself at home? · Get screening tests that you and your doctor decide on. Screening helps find diseases before any symptoms appear. · Eat healthy foods. Choose fruits, vegetables, whole grains, protein, and low-fat dairy foods. Limit fat, especially saturated fat. Reduce salt in your diet. · Limit alcohol. If you are a man, have no more than 2 drinks a day or 14 drinks a week. If you are a woman, have no more than 1 drink a day or 7 drinks a week. · Get at least 30 minutes of physical activity on most days of the week. Walking is a good choice. You also may want to do other activities, such as running, swimming, cycling, or playing tennis or team sports. Discuss any changes in your exercise program with your doctor. · Reach and stay at a healthy weight. This will lower your risk for many problems, such as obesity, diabetes, heart disease, and high blood pressure. · Do not smoke or allow others to smoke around you. If you need help quitting, talk to your doctor about stop-smoking programs and medicines. These can increase your chances of quitting for good. · Care for your mental health. It is easy to get weighed down by worry and stress. Learn strategies to manage stress, like deep breathing and mindfulness, and stay connected with your family and community. If you find you often feel sad or hopeless, talk with your doctor. Treatment can help. · Talk to your doctor about whether you have any risk factors for sexually transmitted infections (STIs). You can help prevent STIs if you wait to have sex with a new partner (or partners) until you've each been tested for STIs. It also helps if you use condoms (male or female condoms) and if you limit your sex partners to one person who only has sex with you. Vaccines are available for some STIs, such as HPV.   · Use birth control if it's important to you to prevent pregnancy. Talk with your doctor about the choices available and what might be best for you. · If you think you may have a problem with alcohol or drug use, talk to your doctor. This includes prescription medicines (such as amphetamines and opioids) and illegal drugs (such as cocaine and methamphetamine). Your doctor can help you figure out what type of treatment is best for you. · Protect your skin from too much sun. When you're outdoors from 10 a.m. to 4 p.m., stay in the shade or cover up with clothing and a hat with a wide brim. Wear sunglasses that block UV rays. Even when it's cloudy, put broad-spectrum sunscreen (SPF 30 or higher) on any exposed skin. · See a dentist one or two times a year for checkups and to have your teeth cleaned. · Wear a seat belt in the car. When should you call for help? Watch closely for changes in your health, and be sure to contact your doctor if you have any problems or symptoms that concern you. Where can you learn more? Go to https://[employer]. org and sign in to your [employer] account. Enter P072 in the [employer] box to learn more about \"Well Visit, Ages 25 to 48: Care Instructions. \"     If you do not have an account, please click on the \"Sign Up Now\" link. Current as of: May 27, 2020               Content Version: 12.8  © 3080-3203 [employer]. Care instructions adapted under license by [facility]. If you have questions about a medical condition or this instruction, always ask your healthcare professional. [place] [address] any warranty or liability for your use of this information.

## 2024-07-12 ENCOUNTER — APPOINTMENT (OUTPATIENT)
Dept: GENERAL RADIOLOGY | Facility: HOSPITAL | Age: 44
End: 2024-07-12
Payer: COMMERCIAL

## 2024-07-12 ENCOUNTER — APPOINTMENT (OUTPATIENT)
Dept: CT IMAGING | Facility: HOSPITAL | Age: 44
End: 2024-07-12
Payer: COMMERCIAL

## 2024-07-12 ENCOUNTER — HOSPITAL ENCOUNTER (EMERGENCY)
Facility: HOSPITAL | Age: 44
Discharge: HOME | End: 2024-07-12
Payer: COMMERCIAL

## 2024-07-12 VITALS
WEIGHT: 140 LBS | DIASTOLIC BLOOD PRESSURE: 82 MMHG | OXYGEN SATURATION: 100 % | SYSTOLIC BLOOD PRESSURE: 133 MMHG | BODY MASS INDEX: 23.32 KG/M2 | RESPIRATION RATE: 16 BRPM | HEART RATE: 93 BPM | HEIGHT: 65 IN | TEMPERATURE: 98.7 F

## 2024-07-12 DIAGNOSIS — T07.XXXA MULTIPLE ABRASIONS: ICD-10-CM

## 2024-07-12 DIAGNOSIS — T07.XXXA MULTIPLE CONTUSIONS: ICD-10-CM
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
## 2024-07-12 PROCEDURE — 73090 X-RAY EXAM OF FOREARM: CPT

## 2024-07-12 PROCEDURE — 99285 EMERGENCY DEPT VISIT HI MDM: CPT

## 2024-07-12 PROCEDURE — 70450 CT HEAD/BRAIN W/O DYE: CPT

## 2024-07-12 PROCEDURE — 73080 X-RAY EXAM OF ELBOW: CPT

## 2024-07-12 PROCEDURE — 72125 CT NECK SPINE W/O DYE: CPT

## 2024-07-12 PROCEDURE — 70498 CT ANGIOGRAPHY NECK: CPT

## 2024-07-12 PROCEDURE — 25510000001 IOPAMIDOL PER 1 ML: Performed by: NURSE PRACTITIONER

## 2024-07-12 PROCEDURE — 70486 CT MAXILLOFACIAL W/O DYE: CPT

## 2024-07-12 RX ORDER — CYCLOBENZAPRINE HCL 10 MG
10 TABLET ORAL 3 TIMES DAILY PRN
Qty: 15 TABLET | Refills: 0 | Status: SHIPPED | OUTPATIENT
Start: 2024-07-12

## 2024-07-12 RX ORDER — KETOROLAC TROMETHAMINE 10 MG/1
10 TABLET, FILM COATED ORAL EVERY 6 HOURS PRN
Qty: 15 TABLET | Refills: 0 | Status: SHIPPED | OUTPATIENT
Start: 2024-07-12

## 2024-07-12 RX ADMIN — IOPAMIDOL 100 ML: 755 INJECTION, SOLUTION INTRAVENOUS at 15:43
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
## 2024-07-12 NOTE — ED NOTES
Patient request to use restroom at this time. Patient provided with hospital socks and escorted to restroom. At this time patient saw that her daughter was in close by ER room and requested to stop and see her for one moment. This nurse said that would be ok. Patient was aware that daughter was being treated in ER.

## 2024-07-12 NOTE — ED NOTES
Vital recheck obtained at this time. Patient had just finished eating pizza and fruit. Patient reports pain when starting to eat but states that it get better with each bite.
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
## 2024-09-26 ENCOUNTER — HOSPITAL ENCOUNTER (OUTPATIENT)
Dept: GENERAL RADIOLOGY | Facility: HOSPITAL | Age: 44
Discharge: HOME | End: 2024-09-26
Admitting: NURSE PRACTITIONER
Payer: COMMERCIAL

## 2024-09-26 ENCOUNTER — TRANSCRIBE ORDERS (OUTPATIENT)
Dept: ADMINISTRATIVE | Facility: HOSPITAL | Age: 44
End: 2024-09-26
Payer: COMMERCIAL

## 2024-09-26 DIAGNOSIS — R07.81 RIB PAIN ON LEFT SIDE: ICD-10-CM

## 2024-09-26 DIAGNOSIS — R07.81 RIB PAIN ON LEFT SIDE: Primary | ICD-10-CM

## 2024-09-26 PROCEDURE — 71100 X-RAY EXAM RIBS UNI 2 VIEWS: CPT
